# Patient Record
Sex: FEMALE | Race: WHITE
[De-identification: names, ages, dates, MRNs, and addresses within clinical notes are randomized per-mention and may not be internally consistent; named-entity substitution may affect disease eponyms.]

---

## 2019-01-11 ENCOUNTER — HOSPITAL ENCOUNTER (EMERGENCY)
Dept: HOSPITAL 94 - ER | Age: 59
LOS: 1 days | Discharge: HOME | End: 2019-01-12
Payer: MEDICARE

## 2019-01-11 VITALS — WEIGHT: 209.44 LBS | BODY MASS INDEX: 35.76 KG/M2 | HEIGHT: 64 IN

## 2019-01-11 VITALS — DIASTOLIC BLOOD PRESSURE: 69 MMHG | SYSTOLIC BLOOD PRESSURE: 114 MMHG

## 2019-01-11 DIAGNOSIS — F31.9: Primary | ICD-10-CM

## 2019-01-11 DIAGNOSIS — F41.9: ICD-10-CM

## 2019-01-11 DIAGNOSIS — Z88.2: ICD-10-CM

## 2019-01-11 PROCEDURE — 99283 EMERGENCY DEPT VISIT LOW MDM: CPT

## 2019-09-13 ENCOUNTER — HOSPITAL ENCOUNTER (EMERGENCY)
Dept: HOSPITAL 94 - ER | Age: 59
LOS: 1 days | Discharge: HOME | End: 2019-09-14
Payer: MEDICARE

## 2019-09-13 VITALS — HEIGHT: 64 IN | WEIGHT: 190.39 LBS | BODY MASS INDEX: 32.5 KG/M2
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
## 2019-09-13 DIAGNOSIS — Z79.899: ICD-10-CM

## 2019-09-13 DIAGNOSIS — Z88.2: ICD-10-CM

## 2019-09-13 DIAGNOSIS — S61.451A: Primary | ICD-10-CM

## 2019-09-13 DIAGNOSIS — S51.852A: ICD-10-CM

## 2019-09-13 DIAGNOSIS — Z79.2: ICD-10-CM

## 2019-09-13 PROCEDURE — 90715 TDAP VACCINE 7 YRS/> IM: CPT

## 2019-09-13 PROCEDURE — 99283 EMERGENCY DEPT VISIT LOW MDM: CPT

## 2019-09-13 PROCEDURE — 90471 IMMUNIZATION ADMIN: CPT

## 2019-09-13 PROCEDURE — 96372 THER/PROPH/DIAG INJ SC/IM: CPT

## 2019-09-14 VITALS — SYSTOLIC BLOOD PRESSURE: 152 MMHG | DIASTOLIC BLOOD PRESSURE: 82 MMHG

## 2021-12-07 ENCOUNTER — OFFICE VISIT (OUTPATIENT)
Dept: URBAN - METROPOLITAN AREA CLINIC 71 | Facility: CLINIC | Age: 61
End: 2021-12-07
Payer: MEDICARE

## 2021-12-07 DIAGNOSIS — S05.02XA CORNEAL ABRASION W/O FB OF LEFT EYE, INITIAL ENCOUNTER: Primary | ICD-10-CM

## 2021-12-07 PROCEDURE — 99203 OFFICE O/P NEW LOW 30 MIN: CPT | Performed by: OPHTHALMOLOGY

## 2021-12-07 ASSESSMENT — INTRAOCULAR PRESSURE
OS: 17
OD: 20

## 2021-12-07 NOTE — IMPRESSION/PLAN
Impression: Corneal abrasion w/o FB of left eye, initial encounter: S05.02xA. Resolving. Plan: Patient to continue use of gentamicin as rx'd by the ER. Recommend patient to also get OTC ointment to be used TID OS, but at least QHS OS for 1 week. Informed patient to use the drop first, then after 5 minutes she can use the ointment. Call if any worsening of symptoms occur. Patient to return next week to see Dr. Radha Pink for a recheck. Will also have patient dilated with OCT testing.

## 2021-12-17 ENCOUNTER — OFFICE VISIT (OUTPATIENT)
Dept: URBAN - METROPOLITAN AREA CLINIC 71 | Facility: CLINIC | Age: 61
End: 2021-12-17
Payer: MEDICARE

## 2021-12-17 DIAGNOSIS — S05.02XD CORNEAL ABRASION WITHOUT FB OF LEFT EYE, SUBSEQUENT ENCOUNTER: Primary | ICD-10-CM

## 2021-12-17 DIAGNOSIS — Z96.1 PRESENCE OF INTRAOCULAR LENS: ICD-10-CM

## 2021-12-17 PROCEDURE — 92014 COMPRE OPH EXAM EST PT 1/>: CPT | Performed by: OPHTHALMOLOGY

## 2021-12-17 ASSESSMENT — INTRAOCULAR PRESSURE
OS: 19
OD: 19

## 2021-12-17 NOTE — IMPRESSION/PLAN
Impression: Corneal abrasion without FB of left eye, subsequent encounter: S05.02xD. Plan: Discussed. Continue lubrication up to 4-5 times a day to help with healing and comfort. Pt informed condition should fully resolve over the next couple weeks. Pt advised vision should improve as healing progresses. Ok to discontinue antibiotic drops.  Contact office if issues persist.

## 2024-01-03 ENCOUNTER — OFFICE VISIT (OUTPATIENT)
Dept: URBAN - METROPOLITAN AREA CLINIC 72 | Facility: CLINIC | Age: 64
End: 2024-01-03
Payer: MEDICARE

## 2024-01-03 DIAGNOSIS — H43.813 VITREOUS DEGENERATION, BILATERAL: Primary | ICD-10-CM

## 2024-01-03 DIAGNOSIS — H18.593 OTHER HEREDITARY CORNEAL DYSTROPHIES, BILATERAL: ICD-10-CM

## 2024-01-03 PROCEDURE — 99203 OFFICE O/P NEW LOW 30 MIN: CPT

## 2024-01-03 ASSESSMENT — INTRAOCULAR PRESSURE
OS: 16
OD: 18

## 2024-01-19 ENCOUNTER — OFFICE VISIT (OUTPATIENT)
Dept: URBAN - METROPOLITAN AREA CLINIC 71 | Facility: CLINIC | Age: 64
End: 2024-01-19
Payer: MEDICARE

## 2024-01-19 DIAGNOSIS — H33.321 ROUND HOLE, RIGHT EYE: Primary | ICD-10-CM

## 2024-01-19 PROCEDURE — 92250 FUNDUS PHOTOGRAPHY W/I&R: CPT | Performed by: OPHTHALMOLOGY

## 2024-01-19 PROCEDURE — 92134 CPTRZ OPH DX IMG PST SGM RTA: CPT | Performed by: OPHTHALMOLOGY

## 2024-01-19 PROCEDURE — 67145 PROPH RTA DTCHMNT PC: CPT | Performed by: OPHTHALMOLOGY

## 2024-01-19 PROCEDURE — 99214 OFFICE O/P EST MOD 30 MIN: CPT | Performed by: OPHTHALMOLOGY

## 2024-01-19 ASSESSMENT — INTRAOCULAR PRESSURE
OD: 18
OS: 18

## 2024-03-01 ENCOUNTER — OFFICE VISIT (OUTPATIENT)
Dept: URBAN - METROPOLITAN AREA CLINIC 71 | Facility: CLINIC | Age: 64
End: 2024-03-01
Payer: MEDICARE

## 2024-03-01 DIAGNOSIS — H33.321 ROUND HOLE, RIGHT EYE: Primary | ICD-10-CM

## 2024-03-01 PROCEDURE — 92250 FUNDUS PHOTOGRAPHY W/I&R: CPT | Performed by: OPHTHALMOLOGY

## 2024-03-01 PROCEDURE — 99214 OFFICE O/P EST MOD 30 MIN: CPT | Performed by: OPHTHALMOLOGY

## 2024-03-01 ASSESSMENT — INTRAOCULAR PRESSURE
OS: 15
OD: 18